# Patient Record
Sex: MALE | Race: AMERICAN INDIAN OR ALASKA NATIVE | NOT HISPANIC OR LATINO | URBAN - METROPOLITAN AREA
[De-identification: names, ages, dates, MRNs, and addresses within clinical notes are randomized per-mention and may not be internally consistent; named-entity substitution may affect disease eponyms.]

---

## 2020-10-19 ENCOUNTER — EMERGENCY (EMERGENCY)
Facility: HOSPITAL | Age: 35
LOS: 0 days | Discharge: HOME | End: 2020-10-19
Attending: EMERGENCY MEDICINE | Admitting: EMERGENCY MEDICINE
Payer: MEDICAID

## 2020-10-19 VITALS
HEART RATE: 76 BPM | WEIGHT: 154.1 LBS | DIASTOLIC BLOOD PRESSURE: 84 MMHG | TEMPERATURE: 98 F | SYSTOLIC BLOOD PRESSURE: 179 MMHG | OXYGEN SATURATION: 99 % | RESPIRATION RATE: 18 BRPM

## 2020-10-19 DIAGNOSIS — K08.89 OTHER SPECIFIED DISORDERS OF TEETH AND SUPPORTING STRUCTURES: ICD-10-CM

## 2020-10-19 PROCEDURE — 99282 EMERGENCY DEPT VISIT SF MDM: CPT

## 2020-10-19 NOTE — ED PROVIDER NOTE - OBJECTIVE STATEMENT
34 yo male, no pmh, presents to ed for 2-3 weeks of left lower posterior tooth pain, mild, aching, no radiation. better with otc meds, no abx use. denies fever, chills, neck pain, numbness, tingling.

## 2020-10-19 NOTE — ED PROVIDER NOTE - PROGRESS NOTE DETAILS
Attending Note:  34 y/o M with no significant PMH, presents c/o left lower dental pain x2-3 weeks. No f/c.  Plan: Transfer to dental clinic.

## 2020-10-19 NOTE — CONSULT NOTE ADULT - SUBJECTIVE AND OBJECTIVE BOX
Patient is a 35y old  Male who presents with a chief complaint of pain in lower left.    HPI: Patient presents with referral: "Please extract #17 ,, Please evaluate and treat # 18 for RCT- and refer patient back for final restoration."      PAST MEDICAL & SURGICAL HISTORY:  No pertinent past medical history    No significant past surgical history      ( - ) heart valve replacement  ( - ) joint replacement    MEDICATIONS:  Denies      Allergies    No Known Allergies    Intolerances        FAMILY HISTORY:      *SOCIAL HISTORY: ( + ) Tobacco: socially; ( - ) ETOH    *Last Dental Visit: has private dentist, referred for #17 extraction and #18 evaluation for RCT    Vital Signs Last 24 Hrs  T(C): 36.8 (19 Oct 2020 09:59), Max: 36.8 (19 Oct 2020 09:59)  T(F): 98.2 (19 Oct 2020 09:59), Max: 98.2 (19 Oct 2020 09:59)  HR: 76 (19 Oct 2020 09:59) (76 - 76)  BP: 179/84 (19 Oct 2020 09:59) (179/84 - 179/84)  BP(mean): --  RR: 18 (19 Oct 2020 09:59) (18 - 18)  SpO2: 99% (19 Oct 2020 09:59) (99% - 99%)    LABS:                  EOE:  TMJ ( - ) clicks                     ( - ) pops                     ( - ) crepitus             Mandible FROM             Facial bones and MOM grossly intact             ( - ) trismus             ( - ) lymphadenopathy             ( - ) swelling             ( - ) asymmetry             ( - ) palpation             ( - ) dyspnea             ( - ) dysphagia             ( - ) loss of consciousness    IOE:   permanent dentition: grossly intact           hard/soft palate: No pathology noted           tongue/FOM No pathology noted           labial/buccal mucosa No pathology noted           ( - ) swelling            ( - ) abscess           ( - ) sinus tract    Mobility: no  Caries: yes        *DENTAL RADIOGRAPHS: 1 PA, 1 panorex    RADIOLOGY & ADDITIONAL STUDIES:    *ASSESSMENT: #18 deemed hopeless prognosis due to deep caries: RCT not viable, #17 impacted      *PLAN: Extraction    PROCEDURE:   Temperature: 98.1F  BP: 110/68  P: 80  Patient notified that #18 deemed unrestorable due to deep caries. Primary dentist called and spoken with on phone: informed of findings, dentist understands and states patient can extract both #17 and #18 at the dental clinic in [facility], will fax over referral for #18 extraction. Patient understands and will schedule OS appointment for extractions.    RECOMMENDATIONS:  1) Pt to return for OS appt for ext #17 and #18.   2) If any difficulty swallowing/breathing, fever occur, return to ER.     Agustina Marino DDS, Pager #[phone]

## 2020-10-19 NOTE — ED PROVIDER NOTE - PHYSICAL EXAMINATION
Physical Exam    Vital Signs: I have reviewed the initial vital signs.  Constitutional: well-nourished, appears stated age, no acute distress  Eyes: Conjunctiva pink, Sclera clear.  ENT: OP is clear without exudates, left lower wisdom tooth mild ttp and partial fracture of tooth, no abscess, normal gingival, tongue without swelling, TMs clear b/l, nasal turbinates without erythema or discharge, no lymphadenopathy.  Musculoskeletal: supple neck  Integumentary: warm, dry, no rash  Neurologic: awake, alert, nvi

## 2020-12-31 ENCOUNTER — OUTPATIENT (OUTPATIENT)
Dept: OUTPATIENT SERVICES | Facility: HOSPITAL | Age: 35
LOS: 1 days | Discharge: HOME | End: 2020-12-31

## 2020-12-31 PROBLEM — Z78.9 OTHER SPECIFIED HEALTH STATUS: Chronic | Status: ACTIVE | Noted: 2020-10-19

## 2021-02-01 ENCOUNTER — EMERGENCY (EMERGENCY)
Facility: HOSPITAL | Age: 36
LOS: 0 days | Discharge: HOME | End: 2021-02-01
Attending: EMERGENCY MEDICINE | Admitting: EMERGENCY MEDICINE
Payer: MEDICAID

## 2021-02-01 VITALS
HEART RATE: 74 BPM | OXYGEN SATURATION: 100 % | TEMPERATURE: 98 F | RESPIRATION RATE: 18 BRPM | DIASTOLIC BLOOD PRESSURE: 86 MMHG | HEIGHT: 64 IN | WEIGHT: 167.99 LBS | SYSTOLIC BLOOD PRESSURE: 124 MMHG

## 2021-02-01 DIAGNOSIS — K08.89 OTHER SPECIFIED DISORDERS OF TEETH AND SUPPORTING STRUCTURES: ICD-10-CM

## 2021-02-01 DIAGNOSIS — K02.9 DENTAL CARIES, UNSPECIFIED: ICD-10-CM

## 2021-02-01 PROCEDURE — 99282 EMERGENCY DEPT VISIT SF MDM: CPT

## 2021-02-01 NOTE — ED PROVIDER NOTE - NS ED ROS FT
Eyes:  No visual changes, eye pain or discharge.  ENMT:  No hearing changes, pain, discharge or infections. No neck pain or stiffness. +tooth pain  Cardiac:  No chest pain, SOB or edema. No chest pain with exertion.  Respiratory:  No cough or respiratory distress. No hemoptysis. No history of asthma or RAD.  GI:  No nausea, vomiting, diarrhea or abdominal pain.  :  No dysuria, frequency or burning.  MS:  No myalgia, muscle weakness, joint pain or back pain.  Neuro:  No headache or weakness.  No LOC.  Skin:  No skin rash.   Endocrine: No history of thyroid disease or diabetes.

## 2021-02-01 NOTE — ED PROVIDER NOTE - CLINICAL SUMMARY MEDICAL DECISION MAKING FREE TEXT BOX
Patient presented with dental pain x 6 months. Otherwise afebrile, HD stable, airway patent, normal O2 saturation on RA, uvula midline, clearing secretions, no tongue elevation. Given the above, will AOU to dental clinic for further management. Patient agreeable with plan. Agrees to return to ED for any new or worsening symptoms.

## 2021-02-01 NOTE — ED PROVIDER NOTE - OBJECTIVE STATEMENT
pt is a 35 yom w/ no pmhx here for L inferior molar pain for 6 months. pt states he came to ED for it before and they did a block. deneis fever/chills, sob, cp.

## 2021-02-01 NOTE — ED PROVIDER NOTE - PHYSICAL EXAMINATION
CONSTITUTIONAL: Well-developed; well-nourished; in no acute distress.   SKIN: warm, dry  HEAD: Normocephalic; atraumatic.  EYES: PERRL, EOMI, normal sclera and conjunctiva   ENT: No nasal discharge; airway clear. +dental caries most prominently at L inferior molar tooth.  NECK: Supple; non tender.  CARD: S1, S2 normal; no murmurs, gallops, or rubs. Regular rate and rhythm.   RESP: No wheezes, rales or rhonchi.  ABD: soft ntnd  EXT: Normal ROM.  No clubbing, cyanosis or edema.   LYMPH: No acute cervical adenopathy.  NEURO: Alert, oriented, grossly unremarkable  PSYCH: Cooperative, appropriate.

## 2021-02-01 NOTE — CONSULT NOTE ADULT - SUBJECTIVE AND OBJECTIVE BOX
Patient is a 35y old  Male who presents with a chief complaint of tooth pain from lower left    HPI: Patient was told during his check up visit with [facility] dental clinic that #17 and #18 needed to be extracted with oral surgery. #18 is giving the patient pain today - small abscess can be see on the buccal gingiva of #18 and is grossly decayed.    PAST MEDICAL & SURGICAL HISTORY:  No pertinent past medical history    No significant past surgical history    MEDICATIONS  (STANDING):    MEDICATIONS  (PRN):    Allergies    No Known Allergies    Intolerances      FAMILY HISTORY:    *SOCIAL HISTORY: ( + ) Tobacco; (   ) ETOH    *Last Dental Visit:    Vital Signs Last 24 Hrs  T(C): 36.7 (01 Feb 2021 10:42), Max: 36.7 (01 Feb 2021 10:42)  T(F): 98 (01 Feb 2021 10:42), Max: 98 (01 Feb 2021 10:42)  HR: 74 (01 Feb 2021 10:42) (74 - 74)  BP: 124/86 (01 Feb 2021 10:42) (124/86 - 124/86)  BP(mean): --  RR: 18 (01 Feb 2021 10:42) (18 - 18)  SpO2: 100% (01 Feb 2021 10:42) (100% - 100%)    EOE:  TMJ ( -  ) clicks                     ( - ) pops                     ( -  ) crepitus             Mandible <<FROM>>             Facial bones and MOM <<grossly intact>>             ( -  ) trismus             ( -  ) lymphadenopathy             ( -  ) swelling             ( -  ) asymmetry             ( -  ) palpation             ( -  ) dyspnea             ( -  ) dysphagia             ( -  ) loss of consciousness    IOE:  permanent dentition: grossly intact           hard/soft palate:  ( - ) palatal torus, <<No pathology noted>>           tongue/FOM <<No pathology noted>>           labial/buccal mucosa <<No pathology noted>>           ( +  ) percussion           ( +  ) palpation           ( -  ) swelling            ( +  ) abscess           ( -  ) sinus tract    *DENTAL RADIOGRAPHS: 1 periapical radiograph 0f #18    RADIOLOGY & ADDITIONAL STUDIES:    *ASSESSMENT: Non-restorable #18    *PLAN: Extraction of #18    PROCEDURE:   All treatment risks and benefits discussed as per OS sheet dated 7/13/00. Consent obtained. Side site marked. Administered 2 carpules of 2% lidocaine with 1:100,000 epinephrine and 1 carpule of 4% septocaine with 1:100,000 epinephrine. Elevated and extracted #18. Curetted and irrigated with saline. Postoperative radiograph and instructions. Hemostasis achieved. Amoxicillin 500mg 3 times a day for 7 days and Ibuprofen 600mg prescribed.    RECOMMENDATIONS:  1) Take prescribed medication as indicated  2) Dental F/U with outpatient dentist for comprehensive dental care.   3) If any difficulty swallowing/breathing, fever occur, return to ER.     Resident Name, pager #

## 2021-02-01 NOTE — ED ADULT NURSE NOTE - NSIMPLEMENTINTERV_GEN_ALL_ED
Implemented All Universal Safety Interventions:  [place] to call system. Call bell, personal items and telephone within reach. Instruct patient to call for assistance. Room bathroom lighting operational. Non-slip footwear when patient is off stretcher. Physically safe environment: no spills, clutter or unnecessary equipment. Stretcher in lowest position, wheels locked, appropriate side rails in place.

## 2022-04-28 ENCOUNTER — OUTPATIENT (OUTPATIENT)
Dept: OUTPATIENT SERVICES | Facility: HOSPITAL | Age: 37
LOS: 1 days | Discharge: HOME | End: 2022-04-28

## 2022-11-23 NOTE — ED PROVIDER NOTE - ATTENDING CONTRIBUTION TO CARE
Anesthesia Volume In Cc: 1 35 year old male, no pmhx, presenting with left inferior molar pain x 6 months. States he came to ED before and at that time he received a block with improvement. However, states over the past few days it has been hurting him more than usual so he came in for evaluation. Otherwise denies fevers, dyspnea, drooling, N/V or any other symptoms.    Vital Signs: I have reviewed the initial vital signs.  Constitutional: NAD, well-nourished, appears stated age, no acute distress.  HEENT: Airway patent, moist MM, no erythema/swelling/deformity of oral structures. EOMI, PERRLA. (+) +dental caries most prominently at L inferior molar tooth, no fluctuance  CV: regular rate, regular rhythm, well-perfused extremities, 2+ b/l DP and radial pulses equal.  Lungs: BCTA, no increased WOB.  ABD: NTND, no guarding or rebound, no pulsatile mass, no hernias.   MSK: Neck supple, nontender, nl ROM, no stepoff. Chest nontender. Back nontender in TLS spine or to b/l bony structures or flanks. Ext nontender, nl rom, no deformity.   INTEG: Skin warm, dry, no rash.  NEURO: A&Ox3, normal strength, nl sensation throughout, normal speech.   PSYCH: Calm, cooperative, normal affect and interaction.    Airway patent. Will AOU to dental clinic for further management. Patient agreeable with plan.

## 2023-02-10 PROBLEM — Z00.00 ENCOUNTER FOR PREVENTIVE HEALTH EXAMINATION: Status: ACTIVE | Noted: 2023-02-10

## 2023-02-15 ENCOUNTER — APPOINTMENT (OUTPATIENT)
Age: 38
End: 2023-02-15

## 2023-02-15 ENCOUNTER — APPOINTMENT (OUTPATIENT)
Dept: GASTROENTEROLOGY | Facility: CLINIC | Age: 38
End: 2023-02-15